# Patient Record
Sex: MALE
[De-identification: names, ages, dates, MRNs, and addresses within clinical notes are randomized per-mention and may not be internally consistent; named-entity substitution may affect disease eponyms.]

---

## 2019-07-17 ENCOUNTER — APPOINTMENT (OUTPATIENT)
Dept: CARDIOLOGY | Facility: CLINIC | Age: 49
End: 2019-07-17
Payer: COMMERCIAL

## 2019-07-17 ENCOUNTER — NON-APPOINTMENT (OUTPATIENT)
Age: 49
End: 2019-07-17

## 2019-07-17 VITALS
OXYGEN SATURATION: 98 % | RESPIRATION RATE: 18 BRPM | HEIGHT: 71 IN | WEIGHT: 169 LBS | HEART RATE: 58 BPM | DIASTOLIC BLOOD PRESSURE: 80 MMHG | SYSTOLIC BLOOD PRESSURE: 116 MMHG | BODY MASS INDEX: 23.66 KG/M2

## 2019-07-17 DIAGNOSIS — Z78.9 OTHER SPECIFIED HEALTH STATUS: ICD-10-CM

## 2019-07-17 DIAGNOSIS — Z82.49 FAMILY HISTORY OF ISCHEMIC HEART DISEASE AND OTHER DISEASES OF THE CIRCULATORY SYSTEM: ICD-10-CM

## 2019-07-17 DIAGNOSIS — Z83.49 FAMILY HISTORY OF OTHER ENDOCRINE, NUTRITIONAL AND METABOLIC DISEASES: ICD-10-CM

## 2019-07-17 PROBLEM — Z00.00 ENCOUNTER FOR PREVENTIVE HEALTH EXAMINATION: Status: ACTIVE | Noted: 2019-07-17

## 2019-07-17 PROCEDURE — 99203 OFFICE O/P NEW LOW 30 MIN: CPT

## 2019-07-17 NOTE — HISTORY OF PRESENT ILLNESS
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 On some days however, he has a typical chest discomfort in the epigastric region which is difficult to differentiate from GERD. He does not have exertional chest pressure symptoms. He is very  active without shortness of breath, PND, orthopnea, dizziness or palpitations.\par \par Strong family history of premature CAD in father and some other family members.\par \par Fasting labs showed hypercholesteremia and hypertriglyceridemia. No history of diabetes. No history of hypertension.

## 2019-07-17 NOTE — PHYSICAL EXAM
[General Appearance - Well Developed] : well developed [Normal Appearance] : normal appearance [Well Groomed] : well groomed [General Appearance - Well Nourished] : well nourished [No Deformities] : no deformities [General Appearance - In No Acute Distress] : no acute distress [Normal Conjunctiva] : the conjunctiva exhibited no abnormalities [Eyelids - No Xanthelasma] : the eyelids demonstrated no xanthelasmas [Normal Oral Mucosa] : normal oral mucosa [No Oral Pallor] : no oral pallor [No Oral Cyanosis] : no oral cyanosis [Heart Rate And Rhythm] : heart rate and rhythm were normal [Heart Sounds] : normal S1 and S2 [Murmurs] : no murmurs present [Arterial Pulses Normal] : the arterial pulses were normal [Edema] : no peripheral edema present [Respiration, Rhythm And Depth] : normal respiratory rhythm and effort [Exaggerated Use Of Accessory Muscles For Inspiration] : no accessory muscle use [Auscultation Breath Sounds / Voice Sounds] : lungs were clear to auscultation bilaterally [Abdomen Soft] : soft [Abdomen Tenderness] : non-tender [Abdomen Mass (___ Cm)] : no abdominal mass palpated [Abnormal Walk] : normal gait [Gait - Sufficient For Exercise Testing] : the gait was sufficient for exercise testing [Nail Clubbing] : no clubbing of the fingernails [Cyanosis, Localized] : no localized cyanosis [Skin Color & Pigmentation] : normal skin color and pigmentation [Skin Turgor] : normal skin turgor [] : no rash [Oriented To Time, Place, And Person] : oriented to person, place, and time [Affect] : the affect was normal [Mood] : the mood was normal [No Anxiety] : not feeling anxious [FreeTextEntry1] : No Carotid bruit. No JVD

## 2019-07-17 NOTE — DISCUSSION/SUMMARY
[FreeTextEntry1] : Patient with coronary risk factors, atypical chest pain. Discussed further evaluation. CTA of coronaries but the most accurate way other than coronary angiography which will be invasive test. Patient is agreeable. He does not report any previous history of renal insufficiency or dye allergies. He will call for the results.\par \par Hypercholesteremia: Dietary changes were  discussed. Exercise program  Was also discussed. Elevated triglycerides. Reduction of carbs was also discussed. Fish oil supplementation\par \par Follow up in future as needed. Depending on findings of CT, a statin therapy may be indicated.\par \par \par Sincerely,\par Ganesh Dalal MD, FACC, [facility]

## 2019-07-17 NOTE — ASSESSMENT
[FreeTextEntry1] : Reviewed today:\par - EKG 07/17/19: Sinus rhythm, unremarkable\par - Labs October 2000 gold . Normal LFTs. Normal creatinine. He will see 5.7. HDL 37 and, .

## 2019-09-03 ENCOUNTER — CLINICAL ADVICE (OUTPATIENT)
Age: 49
End: 2019-09-03

## 2019-09-06 ENCOUNTER — APPOINTMENT (OUTPATIENT)
Dept: CARDIOLOGY | Facility: CLINIC | Age: 49
End: 2019-09-06
Payer: COMMERCIAL

## 2019-09-06 PROCEDURE — 93015 CV STRESS TEST SUPVJ I&R: CPT

## 2019-11-12 ENCOUNTER — MEDICATION RENEWAL (OUTPATIENT)
Age: 49
End: 2019-11-12

## 2019-12-23 ENCOUNTER — APPOINTMENT (OUTPATIENT)
Dept: CARDIOLOGY | Facility: CLINIC | Age: 49
End: 2019-12-23

## 2021-02-03 RX ORDER — ASPIRIN ENTERIC COATED TABLETS 81 MG 81 MG/1
81 TABLET, DELAYED RELEASE ORAL DAILY
Qty: 30 | Refills: 0 | Status: ACTIVE | COMMUNITY
Start: 2021-02-03

## 2021-02-12 ENCOUNTER — APPOINTMENT (OUTPATIENT)
Dept: CARDIOLOGY | Facility: CLINIC | Age: 51
End: 2021-02-12
Payer: COMMERCIAL

## 2021-02-12 PROCEDURE — 99072 ADDL SUPL MATRL&STAF TM PHE: CPT

## 2021-02-12 PROCEDURE — 93015 CV STRESS TEST SUPVJ I&R: CPT

## 2021-02-12 PROCEDURE — 99214 OFFICE O/P EST MOD 30 MIN: CPT | Mod: 25

## 2021-02-12 NOTE — PHYSICAL EXAM
[General Appearance - Well Developed] : well developed [Normal Appearance] : normal appearance [Well Groomed] : well groomed [General Appearance - Well Nourished] : well nourished [No Deformities] : no deformities [General Appearance - In No Acute Distress] : no acute distress [Normal Conjunctiva] : the conjunctiva exhibited no abnormalities [Eyelids - No Xanthelasma] : the eyelids demonstrated no xanthelasmas [Normal Oral Mucosa] : normal oral mucosa [No Oral Cyanosis] : no oral cyanosis [No Oral Pallor] : no oral pallor [Respiration, Rhythm And Depth] : normal respiratory rhythm and effort [Exaggerated Use Of Accessory Muscles For Inspiration] : no accessory muscle use [Auscultation Breath Sounds / Voice Sounds] : lungs were clear to auscultation bilaterally [Heart Rate And Rhythm] : heart rate and rhythm were normal [Heart Sounds] : normal S1 and S2 [Arterial Pulses Normal] : the arterial pulses were normal [Murmurs] : no murmurs present [Edema] : no peripheral edema present [Abdomen Soft] : soft [Abdomen Tenderness] : non-tender [Abdomen Mass (___ Cm)] : no abdominal mass palpated [Abnormal Walk] : normal gait [Gait - Sufficient For Exercise Testing] : the gait was sufficient for exercise testing [Nail Clubbing] : no clubbing of the fingernails [Cyanosis, Localized] : no localized cyanosis [Skin Color & Pigmentation] : normal skin color and pigmentation [Skin Turgor] : normal skin turgor [] : no rash [Oriented To Time, Place, And Person] : oriented to person, place, and time [Affect] : the affect was normal [Mood] : the mood was normal [No Anxiety] : not feeling anxious [FreeTextEntry1] : No Carotid bruit. No JVD

## 2021-02-12 NOTE — ASSESSMENT
[FreeTextEntry1] : Reviewed today:\par - EKG 07/17/19: Sinus rhythm, unremarkable\par - Labs October 2018 . Normal LFTs. Normal creatinine. He will see 5.7. HDL 37 and, . \par -ETT February 2021: Exercise 7: 46 minutes Elver protocol.  No angina.  No ischemia by EKG\par -Labs February 2021: Normal LFT and CK.  LDL 89, HDL 40

## 2021-02-12 NOTE — HISTORY OF PRESENT ILLNESS
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  He has history of atypical chest discomfort.  He does not have exertional chest pressure symptoms. He is very  active without shortness of breath, PND, orthopnea, dizziness or palpitations.\par \par Strong family history of premature CAD in father and some other family members.\par \par Fasting labs showed hypercholesteremia and hypertriglyceridemia. No history of diabetes. No history of hypertension. \par \par Cardiac CT showed CAD. cardiac cath along with OCT showed proximal RCA 50% and mid LAD 60% stenosis.  It was decided to treat medically.  \par \par The patient is on relatively high-dose Crestor and aspirin.  He had some muscle aches which have decreased with co-Q10 supplementation.  He was not able to tolerate higher dose of Crestor.\par Recent lipid profile shows LDL of 89.  HDL 40.  Normal LFT and CK

## 2021-02-12 NOTE — DISCUSSION/SUMMARY
[FreeTextEntry1] : Patient with coronary risk factors, CAD by cardiac cath as noted above \par \par Atypical chest pain is not angina\par \par Good exercise capacity.  He was unable to reach previous levels of duration exercise today due to facemask\par \par Patient on statin therapy, as high as tolerated but not sufficient his LDL 89. Add Zetia. Repeat LDL in 3 to 4 weeks: If LDL is not less than 70, PCSK9 inhibitor treatment is indicated\par \par Continue other medications. Lifestyle changes also discussed\par \par Triglycerides have improved\par \par A quick bedside echo after ETT showed hyperdynamic LV, no regional wall motion analysis and normal pericardium\par \par Follow up in future as needed. \par \par Sincerely,\par Ganesh Dalal MD, FACC, [facility]

## 2022-02-01 ENCOUNTER — APPOINTMENT (OUTPATIENT)
Dept: CARDIOLOGY | Facility: CLINIC | Age: 52
End: 2022-02-01

## 2022-02-01 ENCOUNTER — APPOINTMENT (OUTPATIENT)
Dept: CARDIOLOGY | Facility: CLINIC | Age: 52
End: 2022-02-01
Payer: COMMERCIAL

## 2022-02-01 VITALS
BODY MASS INDEX: 23.29 KG/M2 | DIASTOLIC BLOOD PRESSURE: 86 MMHG | OXYGEN SATURATION: 100 % | SYSTOLIC BLOOD PRESSURE: 124 MMHG | HEART RATE: 80 BPM | WEIGHT: 167 LBS

## 2022-02-01 DIAGNOSIS — R07.89 OTHER CHEST PAIN: ICD-10-CM

## 2022-02-01 DIAGNOSIS — E55.9 VITAMIN D DEFICIENCY, UNSPECIFIED: ICD-10-CM

## 2022-02-01 DIAGNOSIS — R73.03 PREDIABETES.: ICD-10-CM

## 2022-02-01 PROCEDURE — 99214 OFFICE O/P EST MOD 30 MIN: CPT

## 2022-02-01 RX ORDER — METFORMIN ER 500 MG 500 MG/1
500 TABLET ORAL DAILY
Qty: 90 | Refills: 2 | Status: ACTIVE | COMMUNITY
Start: 2022-02-01 | End: 1900-01-01

## 2022-02-01 RX ORDER — EZETIMIBE 10 MG/1
10 TABLET ORAL DAILY
Qty: 90 | Refills: 2 | Status: ACTIVE | COMMUNITY
Start: 2021-02-12 | End: 1900-01-01

## 2022-02-01 NOTE — DISCUSSION/SUMMARY
[FreeTextEntry1] : Patient with coronary risk factors, CAD by cardiac cath as noted above \par \par Good exercise capacity.  He was unable to reach previous levels of duration exercise in February 2021  due to facemask.  ETT and echo should be repeated\par \par Patient on statin therapy, and Zetia. Repeat LDL recently showed increase in LDL to 102.  He had to reduce his Crestor to 20 mg due to ankle pain.  He has vitamin D deficiency which should be supplemented and rechecked\par \par Try Crestor 20 mg again with Zetia.  Recheck labs in 2 months.  If LDL is still not on target  (around 70 ), consider PCSK9 inhibitor treatment.  \par \par A1c 6.3.  We discussed pros and cons of Metformin.  He is agreeable.  Dietary changes, especially reduction of sweets was also recommended.  A1c will also be repeated in 2 months.\par \par Continue other medications. \par \par Follow up after labs in 2-3 months\par \par \par Sincerely,\par Ganesh Dalal MD, FACC, [facility]

## 2022-02-01 NOTE — ASSESSMENT
[FreeTextEntry1] : Reviewed today:\par - EKG 07/17/19: Sinus rhythm, unremarkable\par - Labs October 2018 . Normal LFTs. Normal creatinine. He will see 5.7. HDL 37 and, . \par - ETT February 2021: Exercise 7: 46 minutes Elver protocol.  No angina.  No ischemia by EKG\par - Labs February 2021: Normal LFT and CK.  LDL 89, HDL 40

## 2022-02-01 NOTE — HISTORY OF PRESENT ILLNESS
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 He does not have exertional chest pressure symptoms. He is very  active, walks more than 5000 steps every day, recently shoveled snow for several hours without any chest discomfort, shortness of breath, PND, orthopnea, dizziness or palpitations.\par \par Strong family history of premature CAD in father and some other family members.\par \par He has history of asymptomatic CAD with cardiac cath with OCT showing mid LAD 60% and proximal RCA 50% lesions.  Normal LV function.  He also had CAD on CTA prior to cath.\par \par Fasting labs had demonstrated hypercholesteremia and hypertriglyceridemia in the past. No history of diabetes. No history of hypertension.  However, recent labs last week showed increase in FBS to 119 and A1c to 6.3.  LDL was 101 and HDL 40 and TG 72 on statins.  He does have vitamin D deficiency.\par \par He could not tolerate 40 mg of Crestor so he had to reduce it to 20 mg.  He had ankle pain bilaterally.  The patient is on aspirin.  Also takes co-Q10 supplementation.  He was not able to tolerate higher dose of Crestor.\par \par Previous lipid profile February 2021 shows LDL of 89.  HDL 40.  Normal LFT and CK

## 2022-02-01 NOTE — PHYSICAL EXAM
[General Appearance - Well Developed] : well developed [Normal Appearance] : normal appearance [Well Groomed] : well groomed [General Appearance - Well Nourished] : well nourished [No Deformities] : no deformities [General Appearance - In No Acute Distress] : no acute distress [Normal Conjunctiva] : the conjunctiva exhibited no abnormalities [Eyelids - No Xanthelasma] : the eyelids demonstrated no xanthelasmas [Normal Oral Mucosa] : normal oral mucosa [No Oral Pallor] : no oral pallor [No Oral Cyanosis] : no oral cyanosis [Respiration, Rhythm And Depth] : normal respiratory rhythm and effort [Exaggerated Use Of Accessory Muscles For Inspiration] : no accessory muscle use [Auscultation Breath Sounds / Voice Sounds] : lungs were clear to auscultation bilaterally [Heart Rate And Rhythm] : heart rate and rhythm were normal [Heart Sounds] : normal S1 and S2 [Murmurs] : no murmurs present [Arterial Pulses Normal] : the arterial pulses were normal [Edema] : no peripheral edema present [Abdomen Soft] : soft [Abdomen Tenderness] : non-tender [Abdomen Mass (___ Cm)] : no abdominal mass palpated [Abnormal Walk] : normal gait [Gait - Sufficient For Exercise Testing] : the gait was sufficient for exercise testing [Nail Clubbing] : no clubbing of the fingernails [Cyanosis, Localized] : no localized cyanosis [Skin Color & Pigmentation] : normal skin color and pigmentation [Skin Turgor] : normal skin turgor [] : no rash [Oriented To Time, Place, And Person] : oriented to person, place, and time [Affect] : the affect was normal [Mood] : the mood was normal [No Anxiety] : not feeling anxious [FreeTextEntry1] : No Carotid bruit. No JVD

## 2023-02-07 ENCOUNTER — APPOINTMENT (OUTPATIENT)
Dept: CARDIOLOGY | Facility: CLINIC | Age: 53
End: 2023-02-07
Payer: COMMERCIAL

## 2023-02-07 DIAGNOSIS — E78.00 PURE HYPERCHOLESTEROLEMIA, UNSPECIFIED: ICD-10-CM

## 2023-02-07 DIAGNOSIS — I25.10 ATHEROSCLEROTIC HEART DISEASE OF NATIVE CORONARY ARTERY W/OUT ANGINA PECTORIS: ICD-10-CM

## 2023-02-07 PROCEDURE — 93015 CV STRESS TEST SUPVJ I&R: CPT

## 2023-02-07 RX ORDER — CHOLECALCIFEROL (VITAMIN D3) 1250 MCG
1.25 MG CAPSULE ORAL
Qty: 12 | Refills: 1 | Status: ACTIVE | COMMUNITY
Start: 2022-02-01 | End: 1900-01-01

## 2023-06-23 RX ORDER — METOPROLOL SUCCINATE 25 MG/1
25 TABLET, EXTENDED RELEASE ORAL
Qty: 90 | Refills: 3 | Status: ACTIVE | COMMUNITY
Start: 2023-02-07 | End: 1900-01-01

## 2023-08-04 ENCOUNTER — RX RENEWAL (OUTPATIENT)
Age: 53
End: 2023-08-04

## 2023-08-04 RX ORDER — ROSUVASTATIN CALCIUM 40 MG/1
40 TABLET, FILM COATED ORAL
Qty: 90 | Refills: 3 | Status: ACTIVE | COMMUNITY
Start: 2019-09-03 | End: 1900-01-01